# Patient Record
Sex: MALE | Race: WHITE | NOT HISPANIC OR LATINO | Employment: FULL TIME | URBAN - METROPOLITAN AREA
[De-identification: names, ages, dates, MRNs, and addresses within clinical notes are randomized per-mention and may not be internally consistent; named-entity substitution may affect disease eponyms.]

---

## 2023-04-28 ENCOUNTER — TELEPHONE (OUTPATIENT)
Dept: PRIMARY CARE | Facility: CLINIC | Age: 43
End: 2023-04-28
Payer: COMMERCIAL

## 2023-04-28 NOTE — TELEPHONE ENCOUNTER
PT states he has blood in his semen. Pt does not have blood in his urine and it is not painful. Please advise on scheduling and/ or what the PT should do.

## 2023-05-03 PROBLEM — D17.0 LIPOMA OF SKIN AND SUBCUTANEOUS TISSUE OF NECK: Status: ACTIVE | Noted: 2023-05-03

## 2023-05-03 PROBLEM — D17.1 LIPOMA OF TORSO: Status: ACTIVE | Noted: 2023-05-03

## 2023-05-03 PROBLEM — F32.1 MODERATE SINGLE CURRENT EPISODE OF MAJOR DEPRESSIVE DISORDER (MULTI): Status: ACTIVE | Noted: 2023-05-03

## 2023-05-03 RX ORDER — ESCITALOPRAM OXALATE 20 MG/1
1 TABLET ORAL DAILY
COMMUNITY
Start: 2020-02-24 | End: 2023-05-10 | Stop reason: SDUPTHER

## 2023-05-04 ENCOUNTER — LAB (OUTPATIENT)
Dept: LAB | Facility: LAB | Age: 43
End: 2023-05-04
Payer: COMMERCIAL

## 2023-05-04 ENCOUNTER — OFFICE VISIT (OUTPATIENT)
Dept: PRIMARY CARE | Facility: CLINIC | Age: 43
End: 2023-05-04
Payer: COMMERCIAL

## 2023-05-04 VITALS
HEIGHT: 71 IN | OXYGEN SATURATION: 97 % | HEART RATE: 57 BPM | BODY MASS INDEX: 28.42 KG/M2 | DIASTOLIC BLOOD PRESSURE: 78 MMHG | WEIGHT: 203 LBS | SYSTOLIC BLOOD PRESSURE: 110 MMHG

## 2023-05-04 DIAGNOSIS — R53.83 FATIGUE, UNSPECIFIED TYPE: ICD-10-CM

## 2023-05-04 DIAGNOSIS — R36.1 HEMATOSPERMIA: ICD-10-CM

## 2023-05-04 DIAGNOSIS — R36.1 HEMATOSPERMIA: Primary | ICD-10-CM

## 2023-05-04 DIAGNOSIS — Z15.89 MTHFR GENE MUTATION: ICD-10-CM

## 2023-05-04 LAB
ALANINE AMINOTRANSFERASE (SGPT) (U/L) IN SER/PLAS: 36 U/L (ref 10–52)
ALBUMIN (G/DL) IN SER/PLAS: 4.1 G/DL (ref 3.4–5)
ALKALINE PHOSPHATASE (U/L) IN SER/PLAS: 77 U/L (ref 33–120)
ANION GAP IN SER/PLAS: 10 MMOL/L (ref 10–20)
ASPARTATE AMINOTRANSFERASE (SGOT) (U/L) IN SER/PLAS: 18 U/L (ref 9–39)
BASOPHILS (10*3/UL) IN BLOOD BY AUTOMATED COUNT: 0.03 X10E9/L (ref 0–0.1)
BASOPHILS/100 LEUKOCYTES IN BLOOD BY AUTOMATED COUNT: 0.4 % (ref 0–2)
BILIRUBIN TOTAL (MG/DL) IN SER/PLAS: 0.7 MG/DL (ref 0–1.2)
CALCIUM (MG/DL) IN SER/PLAS: 9 MG/DL (ref 8.6–10.3)
CARBON DIOXIDE, TOTAL (MMOL/L) IN SER/PLAS: 29 MMOL/L (ref 21–32)
CHLORIDE (MMOL/L) IN SER/PLAS: 104 MMOL/L (ref 98–107)
CHOLESTEROL (MG/DL) IN SER/PLAS: 218 MG/DL (ref 0–199)
CHOLESTEROL IN HDL (MG/DL) IN SER/PLAS: 35 MG/DL
CHOLESTEROL/HDL RATIO: 6.2
CREATININE (MG/DL) IN SER/PLAS: 0.95 MG/DL (ref 0.5–1.3)
EOSINOPHILS (10*3/UL) IN BLOOD BY AUTOMATED COUNT: 0.09 X10E9/L (ref 0–0.7)
EOSINOPHILS/100 LEUKOCYTES IN BLOOD BY AUTOMATED COUNT: 1.2 % (ref 0–6)
ERYTHROCYTE DISTRIBUTION WIDTH (RATIO) BY AUTOMATED COUNT: 12.4 % (ref 11.5–14.5)
ERYTHROCYTE MEAN CORPUSCULAR HEMOGLOBIN CONCENTRATION (G/DL) BY AUTOMATED: 33.6 G/DL (ref 32–36)
ERYTHROCYTE MEAN CORPUSCULAR VOLUME (FL) BY AUTOMATED COUNT: 90 FL (ref 80–100)
ERYTHROCYTES (10*6/UL) IN BLOOD BY AUTOMATED COUNT: 4.69 X10E12/L (ref 4.5–5.9)
GFR MALE: >90 ML/MIN/1.73M2
GLUCOSE (MG/DL) IN SER/PLAS: 85 MG/DL (ref 74–99)
HEMATOCRIT (%) IN BLOOD BY AUTOMATED COUNT: 42 % (ref 41–52)
HEMOGLOBIN (G/DL) IN BLOOD: 14.1 G/DL (ref 13.5–17.5)
IMMATURE GRANULOCYTES/100 LEUKOCYTES IN BLOOD BY AUTOMATED COUNT: 0.6 % (ref 0–0.9)
LDL: 164 MG/DL (ref 0–99)
LEUKOCYTES (10*3/UL) IN BLOOD BY AUTOMATED COUNT: 7.7 X10E9/L (ref 4.4–11.3)
LYMPHOCYTES (10*3/UL) IN BLOOD BY AUTOMATED COUNT: 2.96 X10E9/L (ref 1.2–4.8)
LYMPHOCYTES/100 LEUKOCYTES IN BLOOD BY AUTOMATED COUNT: 38.3 % (ref 13–44)
MONOCYTES (10*3/UL) IN BLOOD BY AUTOMATED COUNT: 0.67 X10E9/L (ref 0.1–1)
MONOCYTES/100 LEUKOCYTES IN BLOOD BY AUTOMATED COUNT: 8.7 % (ref 2–10)
NEUTROPHILS (10*3/UL) IN BLOOD BY AUTOMATED COUNT: 3.93 X10E9/L (ref 1.2–7.7)
NEUTROPHILS/100 LEUKOCYTES IN BLOOD BY AUTOMATED COUNT: 50.8 % (ref 40–80)
PLATELETS (10*3/UL) IN BLOOD AUTOMATED COUNT: 316 X10E9/L (ref 150–450)
POC APPEARANCE, URINE: CLEAR
POC BILIRUBIN, URINE: NEGATIVE
POC BLOOD, URINE: NEGATIVE
POC COLOR, URINE: YELLOW
POC GLUCOSE, URINE: NEGATIVE MG/DL
POC KETONES, URINE: NEGATIVE MG/DL
POC LEUKOCYTES, URINE: NEGATIVE
POC NITRITE,URINE: NEGATIVE
POC PH, URINE: 7.5 PH
POC PROTEIN, URINE: NEGATIVE MG/DL
POC SPECIFIC GRAVITY, URINE: 1.01
POC UROBILINOGEN, URINE: 0.2 EU/DL
POTASSIUM (MMOL/L) IN SER/PLAS: 4.3 MMOL/L (ref 3.5–5.3)
PROTEIN TOTAL: 6.3 G/DL (ref 6.4–8.2)
SODIUM (MMOL/L) IN SER/PLAS: 139 MMOL/L (ref 136–145)
THYROTROPIN (MIU/L) IN SER/PLAS BY DETECTION LIMIT <= 0.05 MIU/L: 1.65 MIU/L (ref 0.44–3.98)
TRIGLYCERIDE (MG/DL) IN SER/PLAS: 97 MG/DL (ref 0–149)
UREA NITROGEN (MG/DL) IN SER/PLAS: 11 MG/DL (ref 6–23)
VLDL: 19 MG/DL (ref 0–40)

## 2023-05-04 PROCEDURE — 80061 LIPID PANEL: CPT

## 2023-05-04 PROCEDURE — 1036F TOBACCO NON-USER: CPT | Performed by: FAMILY MEDICINE

## 2023-05-04 PROCEDURE — 84153 ASSAY OF PSA TOTAL: CPT

## 2023-05-04 PROCEDURE — 36415 COLL VENOUS BLD VENIPUNCTURE: CPT

## 2023-05-04 PROCEDURE — 99214 OFFICE O/P EST MOD 30 MIN: CPT | Performed by: FAMILY MEDICINE

## 2023-05-04 PROCEDURE — 84443 ASSAY THYROID STIM HORMONE: CPT

## 2023-05-04 PROCEDURE — 80053 COMPREHEN METABOLIC PANEL: CPT

## 2023-05-04 PROCEDURE — 85025 COMPLETE CBC W/AUTO DIFF WBC: CPT

## 2023-05-04 RX ORDER — MULTIVITAMIN
1 TABLET ORAL
COMMUNITY
Start: 2013-01-22

## 2023-05-04 ASSESSMENT — ENCOUNTER SYMPTOMS
PALPITATIONS: 0
VOMITING: 0
UNEXPECTED WEIGHT CHANGE: 0
CONSTIPATION: 0

## 2023-05-04 NOTE — PROGRESS NOTES
"Subjective   Patient ID: Cody Davies is a 43 y.o. male who presents for blood in semen (Had COVID about 3 weeks ago, in the last week has had blood in semen x 3 frankly burgundy in color, denies pain with ejaculation, denies injury, urinating normally, just needs to have it evaluated/During COVID dis have a lot of muscle pain took 1 leftover oxycontin for the pain that made his testicles feel kind of tingly.).    HPI   No fever chills no UTI symptoms no other bleeding bruising or history of bleeding problems no chance for STDs he says and has not traveled recently  Review of Systems   Constitutional:  Negative for unexpected weight change.   HENT:  Negative for congestion and ear discharge.    Cardiovascular:  Negative for chest pain and palpitations.   Gastrointestinal:  Negative for constipation and vomiting.   All other systems reviewed and are negative.  Does take fish oil supplements 4 thousand daily    Objective   /78   Pulse 57   Ht 1.791 m (5' 10.5\")   Wt 92.1 kg (203 lb)   SpO2 97%   BMI 28.72 kg/m²     Physical Exam  HENT:      Head: Normocephalic and atraumatic.      Nose: Nose normal.      Mouth/Throat:      Mouth: Mucous membranes are moist.      Pharynx: No oropharyngeal exudate.   Eyes:      Extraocular Movements: Extraocular movements intact.      Conjunctiva/sclera: Conjunctivae normal.      Pupils: Pupils are equal, round, and reactive to light.   Cardiovascular:      Rate and Rhythm: Normal rate and regular rhythm.   Pulmonary:      Effort: Pulmonary effort is normal.   Abdominal:      General: There is no distension.      Palpations: Abdomen is soft.   Musculoskeletal:      Cervical back: Normal range of motion and neck supple.   Lymphadenopathy:      Cervical: No cervical adenopathy.   Neurological:      General: No focal deficit present.      Mental Status: He is alert.   Psychiatric:         Attention and Perception: Attention normal.         Speech: Speech normal.         " Behavior: Behavior is cooperative.      circumcised penis no hernias or LAD testes unremarkable no penile discharge  GR normal tone prostate not enlarged no nodules no tenderness    Assessment/Plan   Diagnoses and all orders for this visit:  Hematospermia  Comments:  Reassurance given, recommend rest genital organs to prevent ongoing bleeding and trauma, hold fish oil for 1 week, check labs  Orders:  -     Lipid Panel; Future  -     CBC and Auto Differential; Future  -     Comprehensive Metabolic Panel; Future  -     Prostate Specific Antigen; Future  -     Thyroid Stimulating Hormone; Future  MTHFR gene mutation  -     Lipid Panel; Future  -     CBC and Auto Differential; Future  -     Comprehensive Metabolic Panel; Future  -     Prostate Specific Antigen; Future  -     Thyroid Stimulating Hormone; Future  Fatigue, unspecified type  -     Lipid Panel; Future  -     CBC and Auto Differential; Future  -     Comprehensive Metabolic Panel; Future  -     Prostate Specific Antigen; Future  -     Thyroid Stimulating Hormone; Future  Let me know if persisting after 2 to 3 weeks

## 2023-05-05 LAB — PROSTATE SPECIFIC AG (NG/ML) IN SER/PLAS: 0.7 NG/ML (ref 0–4)

## 2023-05-08 ENCOUNTER — TELEPHONE (OUTPATIENT)
Dept: PRIMARY CARE | Facility: CLINIC | Age: 43
End: 2023-05-08
Payer: COMMERCIAL

## 2023-05-08 NOTE — TELEPHONE ENCOUNTER
----- Message from Giovanni Contreras MD sent at 5/4/2023  4:35 PM EDT -----  Labs look good except chol is quite high, he must have a familial high cholesterol  Consider rosuvastatin 10 mg po every day  Follow low fat low chol diet  Be active every day like walking an hour

## 2023-05-08 NOTE — TELEPHONE ENCOUNTER
Result Communication    Resulted Orders   Lipid Panel   Result Value Ref Range    Cholesterol 218 (H) 0 - 199 mg/dL      Comment:      .      AGE      DESIRABLE   BORDERLINE HIGH   HIGH     0-19 Y     0 - 169       170 - 199     >/= 200    20-24 Y     0 - 189       190 - 224     >/= 225         >24 Y     0 - 199       200 - 239     >/= 240   **All ranges are based on fasting samples. Specific   therapeutic targets will vary based on patient-specific   cardiac risk.  .   Pediatric guidelines reference:Pediatrics 2011, 128(S5).   Adult guidelines reference: NCEP ATPIII Guidelines,     SELINA 2001, 258:2486-97  .   Venipuncture immediately after or during the    administration of Metamizole may lead to falsely   low results. Testing should be performed immediately   prior to Metamizole dosing.    HDL 35.0 (A) mg/dL      Comment:      .      AGE      VERY LOW   LOW     NORMAL    HIGH       0-19 Y       < 35   < 40     40-45     ----    20-24 Y       ----   < 40       >45     ----      >24 Y       ----   < 40     40-60      >60  .    Cholesterol/HDL Ratio 6.2 (A)       Comment:      REF VALUES  DESIRABLE  < 3.4  HIGH RISK  > 5.0     (H) 0 - 99 mg/dL      Comment:      .                           NEAR      BORD      AGE      DESIRABLE  OPTIMAL    HIGH     HIGH     VERY HIGH     0-19 Y     0 - 109     ---    110-129   >/= 130     ----    20-24 Y     0 - 119     ---    120-159   >/= 160     ----      >24 Y     0 -  99   100-129  130-159   160-189     >/=190  .    VLDL 19 0 - 40 mg/dL    Triglycerides 97 0 - 149 mg/dL      Comment:      .      AGE      DESIRABLE   BORDERLINE HIGH   HIGH     VERY HIGH   0 D-90 D    19 - 174         ----         ----        ----  91 D- 9 Y     0 -  74        75 -  99     >/= 100      ----    10-19 Y     0 -  89        90 - 129     >/= 130      ----    20-24 Y     0 - 114       115 - 149     >/= 150      ----         >24 Y     0 - 149       150 - 199    200- 499    >/= 500  .    Venipuncture immediately after or during the    administration of Metamizole may lead to falsely   low results. Testing should be performed immediately   prior to Metamizole dosing.   CBC and Auto Differential   Result Value Ref Range    WBC 7.7 4.4 - 11.3 x10E9/L    RBC 4.69 4.50 - 5.90 x10E12/L    Hemoglobin 14.1 13.5 - 17.5 g/dL    Hematocrit 42.0 41.0 - 52.0 %    MCV 90 80 - 100 fL    MCHC 33.6 32.0 - 36.0 g/dL    Platelets 316 150 - 450 x10E9/L    RDW 12.4 11.5 - 14.5 %    Neutrophils % 50.8 40.0 - 80.0 %    Immature Granulocytes %, Automated 0.6 0.0 - 0.9 %      Comment:       Immature Granulocyte Count (IG) includes promyelocytes,    myelocytes and metamyelocytes but does not include bands.   Percent differential counts (%) should be interpreted in the   context of the absolute cell counts (cells/L).    Lymphocytes % 38.3 13.0 - 44.0 %    Monocytes % 8.7 2.0 - 10.0 %    Eosinophils % 1.2 0.0 - 6.0 %    Basophils % 0.4 0.0 - 2.0 %    Neutrophils Absolute 3.93 1.20 - 7.70 x10E9/L    Lymphocytes Absolute 2.96 1.20 - 4.80 x10E9/L    Monocytes Absolute 0.67 0.10 - 1.00 x10E9/L    Eosinophils Absolute 0.09 0.00 - 0.70 x10E9/L    Basophils Absolute 0.03 0.00 - 0.10 x10E9/L   Comprehensive Metabolic Panel   Result Value Ref Range    Glucose 85 74 - 99 mg/dL    Sodium 139 136 - 145 mmol/L    Potassium 4.3 3.5 - 5.3 mmol/L    Chloride 104 98 - 107 mmol/L    Bicarbonate 29 21 - 32 mmol/L    Anion Gap 10 10 - 20 mmol/L    Urea Nitrogen 11 6 - 23 mg/dL    Creatinine 0.95 0.50 - 1.30 mg/dL    GFR MALE >90 >90 mL/min/1.73m2      Comment:       CALCULATIONS OF ESTIMATED GFR ARE PERFORMED   USING THE 2021 CKD-EPI STUDY REFIT EQUATION   WITHOUT THE RACE VARIABLE FOR THE IDMS-TRACEABLE   CREATININE METHODS.    https://jasn.asnjournals.org/content/early/2021/09/22/ASN.9518334199    Calcium 9.0 8.6 - 10.3 mg/dL    Albumin 4.1 3.4 - 5.0 g/dL    Alkaline Phosphatase 77 33 - 120 U/L    Total Protein 6.3 (L) 6.4 - 8.2 g/dL    AST 18  9 - 39 U/L    Total Bilirubin 0.7 0.0 - 1.2 mg/dL    ALT (SGPT) 36 10 - 52 U/L      Comment:       Patients treated with Sulfasalazine may generate    falsely decreased results for ALT.   Thyroid Stimulating Hormone   Result Value Ref Range    TSH 1.65 0.44 - 3.98 mIU/L      Comment:       TSH testing is performed using different testing    methodology at [facility] than at other    [facility] hospitals. Direct result comparisons should    only be made within the same method.       5:36 PM      Results were successfully communicated with the patient and they acknowledged their understanding.

## 2023-05-09 DIAGNOSIS — E78.2 MIXED HYPERLIPIDEMIA: ICD-10-CM

## 2023-05-09 NOTE — PROGRESS NOTES
Pt. Would like to try to lower cholesterol with diet and exercise, will repeat lipid panel in 3 months.

## 2023-05-10 DIAGNOSIS — F32.1 MODERATE SINGLE CURRENT EPISODE OF MAJOR DEPRESSIVE DISORDER (MULTI): ICD-10-CM

## 2023-05-10 RX ORDER — ESCITALOPRAM OXALATE 20 MG/1
20 TABLET ORAL DAILY
Qty: 90 TABLET | Refills: 1 | Status: SHIPPED | OUTPATIENT
Start: 2023-05-10 | End: 2023-10-24

## 2023-05-10 NOTE — TELEPHONE ENCOUNTER
PT HAS A NEW PHARMACY    Rx Refill Request Telephone Encounter    Name:  Cody Davies  :  [id number]  Medication Name:    ESCITALOPRAM 20MG Q D 90 DAY SUPPLY   Specific Pharmacy location:  [facility]   Date of last appointment:  2023  Date of next appointment:  N/A  Best number to reach patient:  [phone]

## 2023-05-17 ENCOUNTER — OFFICE VISIT (OUTPATIENT)
Dept: PRIMARY CARE | Facility: CLINIC | Age: 43
End: 2023-05-17
Payer: COMMERCIAL

## 2023-05-17 ENCOUNTER — TELEPHONE (OUTPATIENT)
Dept: PRIMARY CARE | Facility: CLINIC | Age: 43
End: 2023-05-17

## 2023-05-17 VITALS
SYSTOLIC BLOOD PRESSURE: 120 MMHG | WEIGHT: 201 LBS | OXYGEN SATURATION: 98 % | HEART RATE: 66 BPM | BODY MASS INDEX: 28.43 KG/M2 | DIASTOLIC BLOOD PRESSURE: 64 MMHG | TEMPERATURE: 97.7 F

## 2023-05-17 DIAGNOSIS — R19.7 DIARRHEA, UNSPECIFIED TYPE: Primary | ICD-10-CM

## 2023-05-17 PROCEDURE — 99214 OFFICE O/P EST MOD 30 MIN: CPT | Performed by: FAMILY MEDICINE

## 2023-05-17 PROCEDURE — 1036F TOBACCO NON-USER: CPT | Performed by: FAMILY MEDICINE

## 2023-05-17 ASSESSMENT — ENCOUNTER SYMPTOMS
CONFUSION: 0
ABDOMINAL PAIN: 0
UNEXPECTED WEIGHT CHANGE: 0
FEVER: 0
TROUBLE SWALLOWING: 0
DYSURIA: 0
WEAKNESS: 0
DIFFICULTY URINATING: 0
DIZZINESS: 0
NAUSEA: 0
SHORTNESS OF BREATH: 0
COUGH: 0
DIARRHEA: 1
BLOOD IN STOOL: 0
WHEEZING: 0
CHILLS: 0
NUMBNESS: 0
VOMITING: 0
LIGHT-HEADEDNESS: 0

## 2023-05-17 NOTE — PROGRESS NOTES
Subjective   Patient ID: Cody Davies is a 43 y.o. male who presents for Diarrhea.  HPI    Onset Monday. Got cleaned out , felt a little better yesterday then started again last jerilyn and during the night, Has done fluids and a little food.  Wifes mother is caring for her family member in a nursing facility with jolie saw mother in law 7 days ago.   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  No diarrhea today, but hasn't eaten much either.  Denies fever, a little fatigued, mild aches.  Everything he's been eating, have also been eaten by everyone else in the house, no one else is having problems.    Over last 24 hours has had 6 episodes of diarrhea.     Had COVID 4 weeks ago.    Review of Systems   Constitutional:  Negative for chills, fever and unexpected weight change.   HENT:  Negative for ear pain and trouble swallowing.    Respiratory:  Negative for cough, shortness of breath and wheezing.    Cardiovascular:  Negative for chest pain.   Gastrointestinal:  Positive for diarrhea. Negative for abdominal pain, blood in stool, nausea and vomiting.   Genitourinary:  Negative for difficulty urinating and dysuria.   Skin:  Negative for rash.   Neurological:  Negative for dizziness, syncope, weakness, light-headedness and numbness.   Psychiatric/Behavioral:  Negative for behavioral problems and confusion.          Objective   /64   Pulse 66   Temp 36.5 °C (97.7 °F)   Wt 91.2 kg (201 lb)   SpO2 98%   BMI 28.43 kg/m²     Physical Exam  Vitals and nursing note reviewed.   Constitutional:       General: He is not in acute distress.     Appearance: Normal appearance.   HENT:      Head: Normocephalic and atraumatic.   Eyes:      General: No scleral icterus.     Extraocular Movements: Extraocular movements intact.      Conjunctiva/sclera: Conjunctivae normal.   Pulmonary:      Effort: Pulmonary effort is normal. No respiratory distress.    Abdominal:      General: Abdomen is flat. Bowel sounds are normal. There is no distension.      Palpations: Abdomen is soft. There is no mass.      Tenderness: There is no abdominal tenderness. There is no right CVA tenderness, left CVA tenderness, guarding or rebound.   Skin:     General: Skin is warm and dry.      Coloration: Skin is not jaundiced.   Neurological:      Mental Status: He is alert and oriented to person, place, and time. Mental status is at baseline.   Psychiatric:         Mood and Affect: Mood normal.         Thought Content: Thought content normal.         Assessment/Plan   Problem List Items Addressed This Visit          Other    Diarrhea - Primary     Likely viral, day 2. [place] diet, hydration. Due to work in  industry, check stool studies.         Relevant Orders    C. difficile, PCR    Ova/Para + Giardia/Cryptosporidium Antigen    Stool Pathogen Panel, PCR

## 2023-05-17 NOTE — PATIENT INSTRUCTIONS
Anticipate usual course of viral gastroenteritis. Vomiting usually lasts for one to two days (up to 4 days) and diarrhea for five to seven days (up to 2 weeks). Recommend a bland (e.g., B.R.A.T. diet), and staying well hydrated. Please seek immediate medical attention or go to the ER/call 911 if unable to stay adequately hydrated (e.g., inability to hold down liquids for 24 hours, visibly dry mouth, decreased urine output, crying without tears, altered consciousness), bloody or black stool or vomit, high fever (>103), fever going away for 48 hours then returning, more than 6-10 stools per day, severe pain, severe symptoms, or other serious concern.    Bananas, Rice, Applesauce, (plain) Toast (B.R.A.T.)    For assistance with scheduling referrals or consultations, please call [phone]. For laboratory, radiology, and other tests, please call [phone] ([phone] for pediatrics). Please review prescription inserts and published information for possible adverse effects. Return after testing or consultation to review results and recommendations, if symptoms persist, change, worsen, or return, or if you have any question or concern. For non-emergencies, you may call the office. For emergencies, call 9-1-1 or go to the nearest Emergency Department. Please schedule additional appointment(s) to address concern(s) not addressed today.    In general, results will not be released or discussed over the telephone. Results of tests done through [facility] are released via  [employer]:  [email or web address]    Until we complete our transition to the new system, additional information can be found at [email or web address] or on your Android or iOS (iPhone, iPad) device using the [employer] betty available free of charge in your device's betty store.

## 2023-05-17 NOTE — TELEPHONE ENCOUNTER
Pt states he has had diarrhea for 3 days now, with very mild nausea when it first started, denies any other cold or flu like symptoms.  Has had minimal contact with a family member who has C-diff.  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 Should he be concerned about C-diff?  Should he have stool test?  Be seen in office?

## 2023-10-24 DIAGNOSIS — F32.1 MODERATE SINGLE CURRENT EPISODE OF MAJOR DEPRESSIVE DISORDER (MULTI): ICD-10-CM

## 2023-10-24 RX ORDER — ESCITALOPRAM OXALATE 20 MG/1
20 TABLET ORAL DAILY
Qty: 90 TABLET | Refills: 3 | Status: SHIPPED | OUTPATIENT
Start: 2023-10-24 | End: 2023-11-06

## 2023-11-04 DIAGNOSIS — F32.1 MODERATE SINGLE CURRENT EPISODE OF MAJOR DEPRESSIVE DISORDER (MULTI): ICD-10-CM

## 2023-11-06 RX ORDER — ESCITALOPRAM OXALATE 20 MG/1
20 TABLET ORAL DAILY
Qty: 90 TABLET | Refills: 1 | Status: SHIPPED | OUTPATIENT
Start: 2023-11-06 | End: 2024-04-23

## 2024-04-01 ENCOUNTER — OFFICE VISIT (OUTPATIENT)
Dept: PRIMARY CARE | Facility: CLINIC | Age: 44
End: 2024-04-01
Payer: COMMERCIAL

## 2024-04-01 ENCOUNTER — LAB (OUTPATIENT)
Dept: LAB | Facility: LAB | Age: 44
End: 2024-04-01
Payer: COMMERCIAL

## 2024-04-01 VITALS
BODY MASS INDEX: 29.12 KG/M2 | OXYGEN SATURATION: 98 % | HEART RATE: 77 BPM | WEIGHT: 208 LBS | SYSTOLIC BLOOD PRESSURE: 126 MMHG | HEIGHT: 71 IN | DIASTOLIC BLOOD PRESSURE: 74 MMHG

## 2024-04-01 DIAGNOSIS — Z12.5 PROSTATE CANCER SCREENING: ICD-10-CM

## 2024-04-01 DIAGNOSIS — R36.1 HEMATOSPERMIA: ICD-10-CM

## 2024-04-01 DIAGNOSIS — E78.2 MIXED HYPERLIPIDEMIA: ICD-10-CM

## 2024-04-01 DIAGNOSIS — K50.819 CROHN'S DISEASE OF SMALL AND LARGE INTESTINES WITH COMPLICATION (MULTI): Primary | ICD-10-CM

## 2024-04-01 LAB
POC APPEARANCE, URINE: CLEAR
POC BILIRUBIN, URINE: NEGATIVE
POC BLOOD, URINE: NEGATIVE
POC COLOR, URINE: YELLOW
POC GLUCOSE, URINE: NEGATIVE MG/DL
POC KETONES, URINE: NEGATIVE MG/DL
POC LEUKOCYTES, URINE: NEGATIVE
POC NITRITE,URINE: NEGATIVE
POC PH, URINE: 8 PH
POC PROTEIN, URINE: NEGATIVE MG/DL
POC SPECIFIC GRAVITY, URINE: 1.02
POC UROBILINOGEN, URINE: 0.2 EU/DL

## 2024-04-01 PROCEDURE — 99213 OFFICE O/P EST LOW 20 MIN: CPT | Performed by: FAMILY MEDICINE

## 2024-04-01 PROCEDURE — 81002 URINALYSIS NONAUTO W/O SCOPE: CPT | Performed by: FAMILY MEDICINE

## 2024-04-01 PROCEDURE — 1036F TOBACCO NON-USER: CPT | Performed by: FAMILY MEDICINE

## 2024-04-01 ASSESSMENT — ENCOUNTER SYMPTOMS
CONSTIPATION: 0
UNEXPECTED WEIGHT CHANGE: 0
PALPITATIONS: 0
VOMITING: 0

## 2024-04-01 ASSESSMENT — COLUMBIA-SUICIDE SEVERITY RATING SCALE - C-SSRS
6. HAVE YOU EVER DONE ANYTHING, STARTED TO DO ANYTHING, OR PREPARED TO DO ANYTHING TO END YOUR LIFE?: NO
2. HAVE YOU ACTUALLY HAD ANY THOUGHTS OF KILLING YOURSELF?: NO
1. IN THE PAST MONTH, HAVE YOU WISHED YOU WERE DEAD OR WISHED YOU COULD GO TO SLEEP AND NOT WAKE UP?: NO

## 2024-04-01 ASSESSMENT — PATIENT HEALTH QUESTIONNAIRE - PHQ9
SUM OF ALL RESPONSES TO PHQ9 QUESTIONS 1 AND 2: 0
2. FEELING DOWN, DEPRESSED OR HOPELESS: NOT AT ALL
1. LITTLE INTEREST OR PLEASURE IN DOING THINGS: NOT AT ALL

## 2024-04-01 NOTE — PROGRESS NOTES
"Subjective   Patient ID: Cody Davies is a 44 y.o. male who presents for Blood in Semen (Happened a year ago a couple times then a couple times more recently no pain ) and Follow-up (meds).    HPI   No fevers chills weight loss pelvic pain back pain  No history of cancer  Patient says he does not feel anything abnormal in his scrotum    Review of Systems   Constitutional:  Negative for unexpected weight change.   HENT:  Negative for congestion and ear discharge.    Cardiovascular:  Negative for chest pain and palpitations.   Gastrointestinal:  Negative for constipation and vomiting.   All other systems reviewed and are negative.      Objective   /74   Pulse 77   Ht 1.791 m (5' 10.5\")   Wt 94.3 kg (208 lb)   SpO2 98%   BMI 29.42 kg/m²     Physical Exam  HENT:      Head: Normocephalic and atraumatic.      Nose: Nose normal.      Mouth/Throat:      Mouth: Mucous membranes are moist.      Pharynx: No oropharyngeal exudate.   Eyes:      Extraocular Movements: Extraocular movements intact.      Conjunctiva/sclera: Conjunctivae normal.      Pupils: Pupils are equal, round, and reactive to light.   Cardiovascular:      Rate and Rhythm: Normal rate and regular rhythm.   Pulmonary:      Effort: Pulmonary effort is normal.   Abdominal:      General: There is no distension.      Palpations: Abdomen is soft.   Musculoskeletal:      Cervical back: Normal range of motion and neck supple.   Lymphadenopathy:      Cervical: No cervical adenopathy.   Neurological:      General: No focal deficit present.      Mental Status: He is alert.   Psychiatric:         Attention and Perception: Attention normal.         Speech: Speech normal.         Behavior: Behavior is cooperative.      exam circumcised penis, testes descended without nodules or masses, normal scrotal contents, no lymphadenopathy, no hernias in inguinal area    Assessment/Plan   Diagnoses and all orders for this visit:  Prostate cancer screening  -     " Prostate Specific Antigen, Screen; Future  Hematospermia  Comments:  Discussed with patient probably benign etiology but given age will check labs and with normal UA refer to urology  Orders:  -     Prostate Specific Antigen, Screen; Future  -     Referral to Urology; Future  -     POCT UA (nonautomated) manually resulted  Crohn's disease of small and large intestines with complication (CMS/HCC)  Comments:  Not active  Recheck 1 month if symptoms persist sooner if worse

## 2024-04-04 ENCOUNTER — LAB (OUTPATIENT)
Dept: LAB | Facility: LAB | Age: 44
End: 2024-04-04
Payer: COMMERCIAL

## 2024-04-04 DIAGNOSIS — E78.2 MIXED HYPERLIPIDEMIA: ICD-10-CM

## 2024-04-04 DIAGNOSIS — R36.1 HEMATOSPERMIA: ICD-10-CM

## 2024-04-04 DIAGNOSIS — Z12.5 PROSTATE CANCER SCREENING: ICD-10-CM

## 2024-04-04 LAB
CHOLEST SERPL-MCNC: 235 MG/DL (ref 0–199)
CHOLESTEROL/HDL RATIO: 6.1
HDLC SERPL-MCNC: 38.8 MG/DL
LDLC SERPL CALC-MCNC: 139 MG/DL
NON HDL CHOLESTEROL: 196 MG/DL (ref 0–149)
PSA SERPL-MCNC: 0.56 NG/ML
TRIGL SERPL-MCNC: 284 MG/DL (ref 0–149)
VLDL: 57 MG/DL (ref 0–40)

## 2024-04-04 PROCEDURE — 36415 COLL VENOUS BLD VENIPUNCTURE: CPT

## 2024-04-04 PROCEDURE — 84153 ASSAY OF PSA TOTAL: CPT

## 2024-04-04 PROCEDURE — 80061 LIPID PANEL: CPT

## 2024-04-04 NOTE — RESULT ENCOUNTER NOTE
Your cholesterol was mildly elevated.  I would recommend regular exercise, keep your weight under control, and follow a low-fat, low-cholesterol diet.  Plant-based diets are ideal for reducing cholesterol and heart disease risk.  A diet high in fruits and vegetables, legumes, nuts, whole grains and fish with minimal amounts of animal products is recommended.  I would also suggest adding on a Fish oil supplement daily. I recommend repeat a fasting lipid profile in one year.  PSA is normal

## 2024-04-23 DIAGNOSIS — F32.1 MODERATE SINGLE CURRENT EPISODE OF MAJOR DEPRESSIVE DISORDER (MULTI): ICD-10-CM

## 2024-04-23 RX ORDER — ESCITALOPRAM OXALATE 20 MG/1
20 TABLET ORAL DAILY
Qty: 90 TABLET | Refills: 2 | Status: SHIPPED | OUTPATIENT
Start: 2024-04-23

## 2024-06-14 ENCOUNTER — APPOINTMENT (OUTPATIENT)
Dept: UROLOGY | Facility: CLINIC | Age: 44
End: 2024-06-14
Payer: COMMERCIAL

## 2024-08-14 ENCOUNTER — APPOINTMENT (OUTPATIENT)
Dept: UROLOGY | Facility: CLINIC | Age: 44
End: 2024-08-14
Payer: COMMERCIAL

## 2024-08-14 DIAGNOSIS — R36.1 HEMATOSPERMIA: Primary | ICD-10-CM

## 2024-08-14 LAB
POC APPEARANCE, URINE: CLEAR
POC BILIRUBIN, URINE: NEGATIVE
POC BLOOD, URINE: NEGATIVE
POC COLOR, URINE: YELLOW
POC GLUCOSE, URINE: NEGATIVE MG/DL
POC KETONES, URINE: NEGATIVE MG/DL
POC LEUKOCYTES, URINE: NEGATIVE
POC NITRITE,URINE: NEGATIVE
POC PH, URINE: 7.5 PH
POC PROTEIN, URINE: NEGATIVE MG/DL
POC SPECIFIC GRAVITY, URINE: 1.01
POC UROBILINOGEN, URINE: 0.2 EU/DL

## 2024-08-14 PROCEDURE — 81003 URINALYSIS AUTO W/O SCOPE: CPT | Performed by: NURSE PRACTITIONER

## 2024-08-14 PROCEDURE — 51798 US URINE CAPACITY MEASURE: CPT | Performed by: NURSE PRACTITIONER

## 2024-08-14 PROCEDURE — 99203 OFFICE O/P NEW LOW 30 MIN: CPT | Performed by: NURSE PRACTITIONER

## 2024-08-14 PROCEDURE — 1036F TOBACCO NON-USER: CPT | Performed by: NURSE PRACTITIONER

## 2024-08-14 NOTE — PROGRESS NOTES
[facility]  Outpatient Clinic Note    Subjective   Cody Davies is a 44 y.o. male    History of Present Illness   Patient presenting to clinic today NPV with complaint of Hematospermia intermittently   over the past year. History of constipation, diarrhea, and Depression. He has no bothersome urinary symptoms. No ED concerns, No history of UTI. No history of kidney stones, Prostate Cancer, or  malignancy.   Denies testicular pain or swelling. Patient denies gross hematuria, dysuria, frequency, fevers, n/v, or flank pain.   No straining to empty, strong stream, no NTF.   He is  with 3 children. Sexually active about every two weeks. Interested in Vasectomy   Urinalysis today is Negative   PVR 5 ml     Lab Results   Component Value Date    PSA 0.70 05/04/2023     Past Medical History and Surgical History   No past medical history on file.  No past surgical history on file.    Medications  Current Outpatient Medications on File Prior to Visit   Medication Sig Dispense Refill    escitalopram (Lexapro) 20 mg tablet TAKE 1 TABLET DAILY 90 tablet 2    multivitamin tablet Take 1 tablet by mouth once daily.       No current facility-administered medications on file prior to visit.       Objective   Physicial Exam  General: Well developed, well nourished, alert and cooperative, appears in no acute distress  Eyes: Non-injected conjunctiva, sclera clear, no proptosis  Cardiac: Extremities are warm and well perfused. No edema, cyanosis or pallor.   Lungs: Breathing is easy, non-labored. Speaking in clear and complete sentences. Normal diaphragmatic movement.  MSK: Ambulatory with steady gait, unassisted  Neuro: alert and oriented to person, place and time  Psych: Demonstrates good judgement and reason, without hallucinations, abnormal affect or abnormal behaviors.  Skin: no obvious lesions, no rashes.    Office Visit on 08/14/2024   Component Date Value Ref Range Status    POC Color, Urine 08/14/2024  Yellow  Straw, Yellow, Light-Yellow Final    POC Appearance, Urine 08/14/2024 Clear  Clear Final    POC Glucose, Urine 08/14/2024 NEGATIVE  NEGATIVE mg/dl Final    POC Bilirubin, Urine 08/14/2024 NEGATIVE  NEGATIVE Final    POC Ketones, Urine 08/14/2024 NEGATIVE  NEGATIVE mg/dl Final    POC Specific Gravity, Urine 08/14/2024 1.015  1.005 - 1.035 Final    POC Blood, Urine 08/14/2024 NEGATIVE  NEGATIVE Final    POC PH, Urine 08/14/2024 7.5  No Reference Range Established PH Final    POC Protein, Urine 08/14/2024 NEGATIVE  NEGATIVE, 30 (1+) mg/dl Final    POC Urobilinogen, Urine 08/14/2024 0.2  0.2, 1.0 EU/DL Final    Poc Nitrite, Urine 08/14/2024 NEGATIVE  NEGATIVE Final    POC Leukocytes, Urine 08/14/2024 NEGATIVE  NEGATIVE Final      Review of Systems  All other systems have been reviewed and are negative for complaint.      Assessment and Plan   The patient presents today with the complaint of blood in his ejaculate. I discussed that hematospermia is almost always benign. We discussed the various etiologies such as inflammation, infection, calculi, trauma, obstruction, and cysts. Hematospermia is rarely due to tumors or vascular abnormalities such as AV fistula.     Urinalysis today is Negative   5/4/2023 PSA  was 0.70    If symptoms persist, consider other diagnostic modalities such as prostate US, cystoscopy, semen culture, and pelvic MRI. Patient interested in Vasectomy. He will be scheduled with Dr. Morales     All questions and concerns were addressed. Patient verbalizes understanding and has no other questions at this time.     Margot Wiggins-- KARLO KHAN  Office Phone:  [phone]

## 2024-08-30 ENCOUNTER — APPOINTMENT (OUTPATIENT)
Dept: UROLOGY | Facility: CLINIC | Age: 44
End: 2024-08-30
Payer: COMMERCIAL

## 2024-09-03 ENCOUNTER — APPOINTMENT (OUTPATIENT)
Dept: UROLOGY | Facility: CLINIC | Age: 44
End: 2024-09-03
Payer: COMMERCIAL

## 2025-01-16 PROBLEM — R36.1 HEMOSPERMIA: Status: ACTIVE | Noted: 2023-05-04

## 2025-01-20 ENCOUNTER — APPOINTMENT (OUTPATIENT)
Dept: PRIMARY CARE | Facility: CLINIC | Age: 45
End: 2025-01-20
Payer: COMMERCIAL

## 2025-01-20 VITALS
HEART RATE: 67 BPM | SYSTOLIC BLOOD PRESSURE: 106 MMHG | WEIGHT: 218.2 LBS | OXYGEN SATURATION: 98 % | DIASTOLIC BLOOD PRESSURE: 76 MMHG | BODY MASS INDEX: 30.87 KG/M2

## 2025-01-20 DIAGNOSIS — G47.33 OSA (OBSTRUCTIVE SLEEP APNEA): Primary | ICD-10-CM

## 2025-01-20 DIAGNOSIS — F32.1 MODERATE SINGLE CURRENT EPISODE OF MAJOR DEPRESSIVE DISORDER (MULTI): ICD-10-CM

## 2025-01-20 PROCEDURE — 1036F TOBACCO NON-USER: CPT | Performed by: FAMILY MEDICINE

## 2025-01-20 PROCEDURE — 99213 OFFICE O/P EST LOW 20 MIN: CPT | Performed by: FAMILY MEDICINE

## 2025-01-20 RX ORDER — ESCITALOPRAM OXALATE 20 MG/1
20 TABLET ORAL DAILY
Qty: 90 TABLET | Refills: 3 | Status: SHIPPED | OUTPATIENT
Start: 2025-01-20

## 2025-01-20 ASSESSMENT — ENCOUNTER SYMPTOMS
UNEXPECTED WEIGHT CHANGE: 0
PALPITATIONS: 0
VOMITING: 0
CONSTIPATION: 0

## 2025-01-20 NOTE — PROGRESS NOTES
Subjective   Patient ID: Cody Davies is a 45 y.o. male who presents for Snoring (Pt is fatigued consistently.  Pt's brother does have sleep apnea.  Pt's wife stated that sometimes it sounds like he stops breathing. Pt has tried mouth tape.).    HPI   C/o snoring, fatigue, daytime sleepiness, witnessed apneas hs by wife    Review of Systems   Constitutional:  Negative for unexpected weight change.   HENT:  Negative for congestion and ear discharge.    Cardiovascular:  Negative for chest pain and palpitations.   Gastrointestinal:  Negative for constipation and vomiting.   All other systems reviewed and are negative.      Objective   /76   Pulse 67   Wt 99 kg (218 lb 3.2 oz)   SpO2 98%   BMI 30.87 kg/m²     Physical Exam  HENT:      Head: Normocephalic and atraumatic.      Nose: Nose normal.      Mouth/Throat:      Mouth: Mucous membranes are moist.      Pharynx: No oropharyngeal exudate.   Eyes:      Extraocular Movements: Extraocular movements intact.      Conjunctiva/sclera: Conjunctivae normal.      Pupils: Pupils are equal, round, and reactive to light.   Cardiovascular:      Rate and Rhythm: Normal rate and regular rhythm.   Pulmonary:      Effort: Pulmonary effort is normal.      Breath sounds: Normal breath sounds.   Abdominal:      General: There is no distension.      Palpations: Abdomen is soft.   Musculoskeletal:      Cervical back: Normal range of motion and neck supple.   Lymphadenopathy:      Cervical: No cervical adenopathy.   Neurological:      General: No focal deficit present.      Mental Status: He is alert.   Psychiatric:         Attention and Perception: Attention normal.         Speech: Speech normal.         Behavior: Behavior is cooperative.         Assessment/Plan   Problem List Items Addressed This Visit    None  Visit Diagnoses         Codes    TEO (obstructive sleep apnea)    -  Primary G47.33    Relevant Orders    Home sleep apnea test (HSAT)        Sleep hygiene  discussed  Will be in touch with sleep study results  Recheck here 2 months sooner if any issues arise

## 2025-01-24 ENCOUNTER — PROCEDURE VISIT (OUTPATIENT)
Dept: SLEEP MEDICINE | Facility: HOSPITAL | Age: 45
End: 2025-01-24
Payer: COMMERCIAL

## 2025-01-24 DIAGNOSIS — G47.33 OSA (OBSTRUCTIVE SLEEP APNEA): ICD-10-CM

## 2025-01-24 PROCEDURE — 95806 SLEEP STUDY UNATT&RESP EFFT: CPT | Performed by: PSYCHIATRY & NEUROLOGY

## 2025-02-07 ENCOUNTER — TELEPHONE (OUTPATIENT)
Dept: PRIMARY CARE | Facility: CLINIC | Age: 45
End: 2025-02-07
Payer: COMMERCIAL

## 2025-02-07 NOTE — TELEPHONE ENCOUNTER
----- Message from Giovanni Contreras sent at 2/7/2025 12:45 PM EST -----  Patient has severe sleep apnea  Please order CPAP settings as per results

## 2025-02-07 NOTE — TELEPHONE ENCOUNTER
Result Communication    No results found from the In Basket message.    2:56 PM      Results were successfully communicated with the patient and they acknowledged their understanding.

## 2025-02-10 ENCOUNTER — TELEPHONE (OUTPATIENT)
Dept: PRIMARY CARE | Facility: CLINIC | Age: 45
End: 2025-02-10
Payer: COMMERCIAL

## 2025-02-10 DIAGNOSIS — G47.33 OSA (OBSTRUCTIVE SLEEP APNEA): ICD-10-CM

## 2025-02-10 NOTE — TELEPHONE ENCOUNTER
PT stated he was to call back with durable medical equipment companies his insurance covers, for CPAP order.  Pt stated [facility] and [facility]   Pt is ok with either, so whichever would be sooner.

## 2025-02-11 ENCOUNTER — APPOINTMENT (OUTPATIENT)
Dept: UROLOGY | Facility: CLINIC | Age: 45
End: 2025-02-11
Payer: COMMERCIAL

## 2025-02-11 DIAGNOSIS — N50.819 PAIN IN TESTICLE, UNSPECIFIED LATERALITY: ICD-10-CM

## 2025-02-11 DIAGNOSIS — R36.1 HEMOSPERMIA: Primary | ICD-10-CM

## 2025-02-11 PROCEDURE — 1036F TOBACCO NON-USER: CPT | Performed by: UROLOGY

## 2025-02-11 PROCEDURE — 99213 OFFICE O/P EST LOW 20 MIN: CPT | Performed by: UROLOGY

## 2025-02-11 NOTE — PROGRESS NOTES
Subjective   Cody Davies is a 45 y.o. male here for evaluation regarding elective sterilization. he is aware of alternatives to vasectomy.     Previous Children: 3 (10 months, 13 and 8 yr old girls)   Previous scrotal surgeries? no  Any current scrotal pain? no  Taking blood thinners? no    Also had recent hematospermia  -resolved and came back  -on and off  -denies dysuria, hematuria    No past medical history on file.    No past surgical history on file.  Component      Latest Ref Rng 4/4/2024   Prostate Specific Antigen,Screen      <=4.00 ng/mL 0.56        UA:  Component      Latest Ref Rng 8/14/2024   POC Color, Urine      Straw, Yellow, Light-Yellow  Yellow    POC Appearance, Urine      Clear  Clear    POC Specific Gravity, Urine      1.005 - 1.035  1.015    POC PH, Urine      No Reference Range Established PH 7.5    POC Protein, Urine      NEGATIVE, 30 (1+) mg/dl NEGATIVE    POC Glucose, Urine      NEGATIVE mg/dl NEGATIVE    POC Blood, Urine      NEGATIVE  NEGATIVE    POC Ketones, Urine      NEGATIVE mg/dl NEGATIVE    POC Bilirubin, Urine      NEGATIVE  NEGATIVE    POC Urobilinogen, Urine      0.2, 1.0 EU/DL 0.2    Poc Nitrite, Urine      NEGATIVE  NEGATIVE    POC Leukocytes, Urine      NEGATIVE  NEGATIVE           Your medication list            Accurate as of February 11, 2025  3:04 PM. If you have any questions, ask your nurse or doctor.                CONTINUE taking these medications        Instructions Last Dose Given Next Dose Due   escitalopram 20 mg tablet  Commonly known as: Lexapro      TAKE 1 TABLET DAILY       multivitamin tablet                    Allergies   Allergen Reactions    Amoxicillin-Pot Clavulanate Unknown        Exam  Testicles descended bilaterally, vas palpable  Penis without lesions    #Vasectomy  Desires elective vasectomy.  In summary, the patient has a history of fecundity anxiety and would like to proceed with a vasectomy.  I had a detailed discussion with him regarding  the risks, benefits and alternatives to the procedure and he would like to proceed at this time.      I discussed the following with the patient:    · Vasectomy is intended to be a permanent form of contraception.  · Vasectomy does not produce immediate sterility.  · Following vasectomy, another form of contraception is required until vas occlusion is confirmed by post- vasectomy semen analysis (PVSA).  · Even after vas occlusion is confirmed, vasectomy is not 100% reliable in preventing pregnancy.  · The risk of pregnancy after vasectomy is approximately 1 in 2,000 for men who have post-vasectomy azoospermia or PVSA showing rare non-motile sperm (RNMS).  · Repeat vasectomy is necessary in <1% of vasectomies, provided that a technique for vas occlusion known to have a low occlusive failure rate has been used.  · Patients should refrain from ejaculation for approximately one week after vasectomy.  · Options for fertility after vasectomy include vasectomy reversal and sperm retrieval with in vitro fertilization. These options are not always successful, and they may be expensive.    Sperm banking was also offered to the patient.    He understands that he must have protected intercourse after the vasectomy until he is able to provide a negative semen sample.  He may stop using other methods of contraception when examination of one well-mixed, uncentrifuged, fresh post-vasectomy semen specimen shows azoospermia or only rare non-motile sperm (RNMS or <100,000 non-motile sperm/mL).    Cannot do MRI study  Will proceed with vasectomy with: Local  Sperm Banking information given per patient request : no     #hematospermia  UA/CX/GC/CT/ureaplasma/mycoplasma

## 2025-02-14 LAB
BACTERIA #/AREA URNS HPF: NORMAL /HPF
BACTERIA UR CULT: NORMAL
C TRACH RRNA SPEC QL NAA+PROBE: NOT DETECTED
HYALINE CASTS #/AREA URNS LPF: NORMAL /LPF
M HOMINIS SPEC QL CULT: NORMAL
N GONORRHOEA RRNA SPEC QL NAA+PROBE: NOT DETECTED
QUEST GC CT AMPLIFIED (ALWAYS MESSAGE): NORMAL
RBC #/AREA URNS HPF: NORMAL /HPF
SERVICE CMNT-IMP: NORMAL
SQUAMOUS #/AREA URNS HPF: NORMAL /HPF
WBC #/AREA URNS HPF: NORMAL /HPF

## 2025-02-28 ENCOUNTER — APPOINTMENT (OUTPATIENT)
Dept: UROLOGY | Facility: CLINIC | Age: 45
End: 2025-02-28
Payer: COMMERCIAL

## 2025-02-28 VITALS — HEIGHT: 70 IN | BODY MASS INDEX: 30.35 KG/M2 | TEMPERATURE: 97 F | WEIGHT: 212 LBS

## 2025-02-28 DIAGNOSIS — Z30.09 VASECTOMY EVALUATION: ICD-10-CM

## 2025-02-28 PROCEDURE — 99213 OFFICE O/P EST LOW 20 MIN: CPT | Performed by: UROLOGY

## 2025-02-28 PROCEDURE — 55250 REMOVAL OF SPERM DUCT(S): CPT | Performed by: UROLOGY

## 2025-02-28 ASSESSMENT — PAIN SCALES - GENERAL: PAINLEVEL_OUTOF10: 0-NO PAIN

## 2025-02-28 NOTE — PROGRESS NOTES
Pre-Procedure Diagnosis: Fecundity anxiety   Post-Procedure Diagnosis: Same   Procedure Performed: Bilateral vasectomy     Surgeon: Iza Morales MD   Assistant: Reshma Soriano RN  EBL: 5cc   Complications: None   Specimens: None   Anesthesia: Local        Procedure in Detail:     After obtaining informed consent, the patient was prepped and draped in the standard sterile fashion in the lower abdominal and genitalia region.       Starting on the patient's right hemiscrotum, the vas deferens was identified and isolated. Local analgesia was performed using a 50:50 mixture of 1% lidocaine and 1% bupivicaine to anesthetize the skin and the perivasal tissue.  A 1 cm longitudinal incision was made and the vas deferens was isolated with a vas clamp. The vasal tissue was incised and the vas deferens was brought out through the wound The middle segment of vas deferens was then excised and passed off of the field. The musocal was cauterized with needle tip bovie electrocautery. After obtaining adequate hemostasis, the distal vasal end was put back into the scrotum .  The proximal vasal end was then dropped back into the scrotum and the scrotum was closed with 3-0 chromic in a simple interrupted fashion.  An identical procedure was performed on the patients left side.      The wounds were then cleaned and draped with sterile telfa gauze.  Scrotal support and fluffs were applied. He tolerated the procedure well and was sent home in stable condition. I gave him strict instructions that he must have a negative semen sample before engaging in unprotected intercourse or he is at risk for achieving a pregnancy. He will follow up in 4 months.     Preet Attestation  By signing my name below, IMegan Scribe attest that this documentation has been prepared under the direction and in the presence of Iza Morales MD. All medical record entries made by the Preet were at my direction or personally dictated by me.  I have reviewed the chart and agree that the record accurately reflects my personal performance of the history, physical exam, discussion and plan.

## 2025-02-28 NOTE — PROGRESS NOTES
Subjective   Cody Davies is a 45 y.o. male here for evaluation regarding elective sterilization. he is aware of alternatives to vasectomy.     Last visit 2/11/2025:  -Will proceed with Vasectomy.    Today's visit  Previous Children: 3 (10 months, 13 and 8 yr old girls)   Previous scrotal surgeries? no  Any current scrotal pain? no  Taking blood thinners? no    hematospermia  -resolved and came back  -on and off  -denies dysuria, hematuria  -labs reviewed, all neg, but mycoplasma and ureaplasma pending   Ucx neg  GC/CT negative    Component      Latest Ref Rng 2/11/2025   WBC      < OR = 5 /HPF NONE SEEN    RBC      < OR = 2 /HPF NONE SEEN    SQUAMOUS EPITHELIAL CELLS      < OR = 5 /HPF NONE SEEN    BACTERIA      NONE SEEN /HPF NONE SEEN    HYALINE CAST      NONE SEEN /LPF NONE SEEN    NOTE --        Component      Latest Ref Rng 4/4/2024   Prostate Specific Antigen,Screen      <=4.00 ng/mL 0.56        UA:  Component      Latest Ref Rng 8/14/2024   POC Color, Urine      Straw, Yellow, Light-Yellow  Yellow    POC Appearance, Urine      Clear  Clear    POC Specific Gravity, Urine      1.005 - 1.035  1.015    POC PH, Urine      No Reference Range Established PH 7.5    POC Protein, Urine      NEGATIVE, 30 (1+) mg/dl NEGATIVE    POC Glucose, Urine      NEGATIVE mg/dl NEGATIVE    POC Blood, Urine      NEGATIVE  NEGATIVE    POC Ketones, Urine      NEGATIVE mg/dl NEGATIVE    POC Bilirubin, Urine      NEGATIVE  NEGATIVE    POC Urobilinogen, Urine      0.2, 1.0 EU/DL 0.2    Poc Nitrite, Urine      NEGATIVE  NEGATIVE    POC Leukocytes, Urine      NEGATIVE  NEGATIVE           Your medication list            Accurate as of February 28, 2025 11:39 AM. If you have any questions, ask your nurse or doctor.                CONTINUE taking these medications        Instructions Last Dose Given Next Dose Due   escitalopram 20 mg tablet  Commonly known as: Lexapro      TAKE 1 TABLET DAILY       multivitamin tablet                     Allergies   Allergen Reactions    Amoxicillin-Pot Clavulanate Unknown        Exam  Testicles descended bilaterally, vas palpable  Penis without lesions    #Vasectomy  Desires elective vasectomy.  In summary, the patient has a history of fecundity anxiety and would like to proceed with a vasectomy.  I had a detailed discussion with him regarding the risks, benefits and alternatives to the procedure and he would like to proceed at this time.      I discussed the following with the patient:    · Vasectomy is intended to be a permanent form of contraception.  · Vasectomy does not produce immediate sterility.  · Following vasectomy, another form of contraception is required until vas occlusion is confirmed by post- vasectomy semen analysis (PVSA).  · Even after vas occlusion is confirmed, vasectomy is not 100% reliable in preventing pregnancy.  · The risk of pregnancy after vasectomy is approximately 1 in 2,000 for men who have post-vasectomy azoospermia or PVSA showing rare non-motile sperm (RNMS).  · Repeat vasectomy is necessary in <1% of vasectomies, provided that a technique for vas occlusion known to have a low occlusive failure rate has been used.  · Patients should refrain from ejaculation for approximately one week after vasectomy.  · Options for fertility after vasectomy include vasectomy reversal and sperm retrieval with in vitro fertilization. These options are not always successful, and they may be expensive.    Sperm banking was also offered to the patient.    He understands that he must have protected intercourse after the vasectomy until he is able to provide a negative semen sample.  He may stop using other methods of contraception when examination of one well-mixed, uncentrifuged, fresh post-vasectomy semen specimen shows azoospermia or only rare non-motile sperm (RNMS or <100,000 non-motile sperm/mL).    Cannot do MRI study  Will proceed with vasectomy with: Local  Sperm Banking information  given per patient request : no     #hematospermia  UA/CX/GC/CT/ureaplasma/mycoplasma     Scribe Attestation  By signing my name below, I, Preet Nixon attest that this documentation has been prepared under the direction and in the presence of Iza Morales MD. All medical record entries made by the [place] were at my direction or personally dictated by me. I have reviewed the chart and agree that the record accurately reflects my personal performance of the history, physical exam, discussion and plan.

## 2025-03-24 ENCOUNTER — APPOINTMENT (OUTPATIENT)
Dept: PRIMARY CARE | Facility: CLINIC | Age: 45
End: 2025-03-24
Payer: COMMERCIAL

## 2025-03-24 VITALS
WEIGHT: 212 LBS | HEIGHT: 70 IN | BODY MASS INDEX: 30.35 KG/M2 | SYSTOLIC BLOOD PRESSURE: 110 MMHG | DIASTOLIC BLOOD PRESSURE: 78 MMHG

## 2025-03-24 DIAGNOSIS — K50.919 CROHN'S DISEASE WITH COMPLICATION, UNSPECIFIED GASTROINTESTINAL TRACT LOCATION: ICD-10-CM

## 2025-03-24 DIAGNOSIS — G47.33 OSA (OBSTRUCTIVE SLEEP APNEA): Primary | ICD-10-CM

## 2025-03-24 PROCEDURE — 1036F TOBACCO NON-USER: CPT | Performed by: FAMILY MEDICINE

## 2025-03-24 PROCEDURE — 99213 OFFICE O/P EST LOW 20 MIN: CPT | Performed by: FAMILY MEDICINE

## 2025-03-24 PROCEDURE — 3008F BODY MASS INDEX DOCD: CPT | Performed by: FAMILY MEDICINE

## 2025-03-24 ASSESSMENT — ENCOUNTER SYMPTOMS
PALPITATIONS: 0
VOMITING: 0
UNEXPECTED WEIGHT CHANGE: 0
CONSTIPATION: 0

## 2025-03-24 NOTE — PROGRESS NOTES
"Subjective   Patient ID: Cody Davies is a 45 y.o. male who presents for Follow-up (Pt in to follow up with cpap use.  Pt has noticed a huge improvement.  ).    HPI   On cpap 5-6 weeks now  Last 2 weeks has felt rested and clear minded  Switches tasks easier  Less depressed  Uses the mask nightly except 3 nights, uses it for 6-8 hours a night  No side effects or issues with it  He cleans it, it is easy to use  No snoring  No daytime sleepiness, far less fatigue he says  Eating better, lost 6#, more active  [place]  Review of Systems   Constitutional:  Negative for unexpected weight change.   HENT:  Negative for congestion and ear discharge.    Cardiovascular:  Negative for chest pain and palpitations.   Gastrointestinal:  Negative for constipation and vomiting.   All other systems reviewed and are negative.      Objective   /78   Ht 1.778 m (5' 10\")   Wt 96.2 kg (212 lb)   BMI 30.42 kg/m²     Physical Exam  HENT:      Head: Normocephalic and atraumatic.      Nose: Nose normal.      Mouth/Throat:      Mouth: Mucous membranes are moist.      Pharynx: No oropharyngeal exudate.   Eyes:      Extraocular Movements: Extraocular movements intact.      Conjunctiva/sclera: Conjunctivae normal.      Pupils: Pupils are equal, round, and reactive to light.   Cardiovascular:      Rate and Rhythm: Normal rate and regular rhythm.   Pulmonary:      Effort: Pulmonary effort is normal.      Breath sounds: Normal breath sounds.   Abdominal:      General: There is no distension.      Palpations: Abdomen is soft.   Musculoskeletal:      Cervical back: Normal range of motion and neck supple.   Lymphadenopathy:      Cervical: No cervical adenopathy.   Neurological:      General: No focal deficit present.      Mental Status: He is alert.   Psychiatric:         Attention and Perception: Attention normal.         Speech: Speech normal.         Behavior: Behavior is cooperative.         Assessment/Plan   Problem List Items Addressed " This Visit             ICD-10-CM    Regional enteritis (Multi) K50.90     Other Visit Diagnoses         Codes    TEO (obstructive sleep apnea)    -  Primary G47.33        Obstructive Sleep apnea will controlled with CPAP  Continue current regimen  Diet exercise weight discussed  Recheck 3 to 6 months sooner if any issues arise

## 2025-07-02 ENCOUNTER — ANCILLARY PROCEDURE (OUTPATIENT)
Dept: ENDOCRINOLOGY | Facility: CLINIC | Age: 45
End: 2025-07-02
Payer: COMMERCIAL

## 2025-07-02 DIAGNOSIS — Z30.09 VASECTOMY EVALUATION: ICD-10-CM

## 2025-07-02 LAB
ABSTINENCE (DAYS): 7 DAYS (ref 2–7)
AGGLUTINATION (SEMEN): NO
ANALYZED TIME:: ABNORMAL
ANDROLOGY LAB ID#: ABNORMAL
CLUMPS (SEMEN): NO
COLLECTED COMPLETELY: YES
COLLECTION LOCATION:: ABNORMAL
COLLECTION METHOD:: ABNORMAL
CONCENTRATION CN (POST-WASH): 0 MILL/ML
CONCENTRATION(SEMEN): 0 MILL/ML (ref 15–?)
DEBRIS (SEMEN): YES
NON PROG. MOTILITY (SEMEN): 0 %
NON PROG. MOTILITY CN (POST-WASH): 0 %
PROG. MOTILITY (SEMEN): 0 % (ref 32–?)
PROG. MOTILITY CN (POST-WASH): 0 %
RECEIVED TIME:: ABNORMAL
SEMEN APPEARANCE: NORMAL
SEMEN LIQUEFACTION: NORMAL
SEMEN VISCOSITY: ABNORMAL
TOTAL MOTILITY (SEMEN): 0 % (ref 40–?)
TOTAL MOTILITY CN (POST-WASH): 0 %
TOTAL NO OF MOTILE (SEMEN): 0 MILL
TOTAL NO OF MOTILE CN (POST-WASH): 0 MILL
TOTAL NO OF SPERM (SEMEN): 0 MILL (ref 39–?)
TOTAL NO OF SPERM CN (POST-WASH): 0 MILL
VOLUME (SEMEN): 1.8 ML (ref 1.5–?)
VOLUME CN (POST-WASH): 0.2 ML

## 2025-07-02 PROCEDURE — 89321 SEMEN ANAL SPERM DETECTION: CPT | Performed by: OBSTETRICS & GYNECOLOGY

## 2025-08-03 ENCOUNTER — HOSPITAL ENCOUNTER (EMERGENCY)
Facility: HOSPITAL | Age: 45
Discharge: HOME | End: 2025-08-03
Attending: STUDENT IN AN ORGANIZED HEALTH CARE EDUCATION/TRAINING PROGRAM
Payer: COMMERCIAL

## 2025-08-03 ENCOUNTER — APPOINTMENT (OUTPATIENT)
Dept: RADIOLOGY | Facility: HOSPITAL | Age: 45
End: 2025-08-03
Payer: COMMERCIAL

## 2025-08-03 VITALS
SYSTOLIC BLOOD PRESSURE: 116 MMHG | HEART RATE: 66 BPM | WEIGHT: 210 LBS | TEMPERATURE: 97.7 F | BODY MASS INDEX: 30.06 KG/M2 | DIASTOLIC BLOOD PRESSURE: 73 MMHG | RESPIRATION RATE: 15 BRPM | HEIGHT: 70 IN | OXYGEN SATURATION: 96 %

## 2025-08-03 DIAGNOSIS — S67.22XA CRUSHING INJURY OF LEFT HAND, INITIAL ENCOUNTER: ICD-10-CM

## 2025-08-03 DIAGNOSIS — S61.412A LACERATION OF LEFT HAND WITHOUT FOREIGN BODY, INITIAL ENCOUNTER: Primary | ICD-10-CM

## 2025-08-03 PROCEDURE — 90715 TDAP VACCINE 7 YRS/> IM: CPT | Performed by: PHYSICIAN ASSISTANT

## 2025-08-03 PROCEDURE — 73130 X-RAY EXAM OF HAND: CPT | Mod: LT

## 2025-08-03 PROCEDURE — 12002 RPR S/N/AX/GEN/TRNK2.6-7.5CM: CPT | Performed by: PHYSICIAN ASSISTANT

## 2025-08-03 PROCEDURE — 2500000004 HC RX 250 GENERAL PHARMACY W/ HCPCS (ALT 636 FOR OP/ED): Performed by: PHYSICIAN ASSISTANT

## 2025-08-03 PROCEDURE — 73130 X-RAY EXAM OF HAND: CPT | Mod: LEFT SIDE | Performed by: RADIOLOGY

## 2025-08-03 PROCEDURE — 99283 EMERGENCY DEPT VISIT LOW MDM: CPT | Performed by: STUDENT IN AN ORGANIZED HEALTH CARE EDUCATION/TRAINING PROGRAM

## 2025-08-03 PROCEDURE — 90471 IMMUNIZATION ADMIN: CPT | Performed by: PHYSICIAN ASSISTANT

## 2025-08-03 RX ORDER — LIDOCAINE HYDROCHLORIDE 10 MG/ML
10 INJECTION, SOLUTION INFILTRATION; PERINEURAL ONCE
Status: COMPLETED | OUTPATIENT
Start: 2025-08-03 | End: 2025-08-03

## 2025-08-03 RX ADMIN — LIDOCAINE HYDROCHLORIDE 10 ML: 10 INJECTION, SOLUTION INFILTRATION; PERINEURAL at 18:49

## 2025-08-03 RX ADMIN — TETANUS TOXOID, REDUCED DIPHTHERIA TOXOID AND ACELLULAR PERTUSSIS VACCINE, ADSORBED 0.5 ML: 5; 2.5; 8; 8; 2.5 SUSPENSION INTRAMUSCULAR at 19:32

## 2025-08-03 ASSESSMENT — PAIN DESCRIPTION - LOCATION: LOCATION: HAND

## 2025-08-03 ASSESSMENT — PAIN DESCRIPTION - ORIENTATION: ORIENTATION: LEFT

## 2025-08-03 ASSESSMENT — PAIN - FUNCTIONAL ASSESSMENT: PAIN_FUNCTIONAL_ASSESSMENT: 0-10

## 2025-08-03 ASSESSMENT — PAIN SCALES - GENERAL
PAINLEVEL_OUTOF10: 0 - NO PAIN
PAINLEVEL_OUTOF10: 2

## 2025-08-03 NOTE — ED NOTES
Pt came into the ER with a laceration above his left fourth finger.  He is unable to remove his wedding band due to the swelling.       Raymond Hernandez RN  08/03/25 6779

## 2025-08-03 NOTE — ED PROVIDER NOTES
HPI   Chief Complaint   Patient presents with    Hand Injury     Pt states he was moving home gym equipment and left hand got smashed between 2 pieces of metal. Laceration below left 4th finger       History of present illness:  45-year-old male presents emergency room for complaints of left hand crush injury and lacerations.  The patient states that he was attempting to move a home gym equipment and he states a couple of the bars fell and unfortunately they landed on top of his left hand as he was holding another piece crushing his hand.  He states that his hand immediately began to swell and he was able to get the virus off his hand.  He states he is right-hand dominant.  He has not been look at his wedding ring freed and states the swelling is gotten worse.  He denies any numbness or tingling in his fingers at this time and noticed lacerations across the fourth knuckle and was concerned about this.  He states he cannot recall his last tetanus shot.  He denies any other symptoms at this time.    Social history: Negative for alcohol and drug use.    Review of systems:   Gen.: No weight loss, fatigue, anorexia, insomnia, fever.   Eyes: No vision loss, double vision, drainage, eye pain.   ENT: No pharyngitis, dry mouth.   Cardiac: No chest pain, palpitations, syncope, near syncope.   Pulmonary: No shortness of breath, cough, hemoptysis.   Heme/lymph: No swollen glands, fever, bleeding.   GI: No abdominal pain, change in bowel habits, melena, hematemesis, hematochezia, nausea, vomiting, diarrhea.   : No discharge, dysuria, frequency, urgency, hematuria.   Musculoskeletal: No  joint pain, joint swelling.   Skin: No rashes.   Review of systems is otherwise negative unless stated above or in history of present illness.      Physical exam:  General: Vitals noted, no distress. Afebrile.   EENT: No lymphadenopathy appreciated  Cardiac: Regular, rate, rhythm, no murmur.   Pulmonary: Lungs clear bilaterally with good  aeration. No adventitious breath sounds.   Abdomen: Soft, nonsurgical. Nontender. No peritoneal signs. Normoactive bowel sounds.   Extremities: No peripheral edema.  There is obvious swelling present over the left hand particularly over the fourth digit and at the base of the fourth digit over the fourth metacarpal, good cap refill and sensation is present all fingers at this time, nailbeds are intact, there is a gold ring present at the base of the fourth digit on the left hand.  This cannot be removed due to the swelling at this time.  I first attempted using lubrication without success and also attempted to use a rubber band to try and help free the ring but the patient would not tolerate the procedure.  We opted with his permission to cut the ring at this time which was done we are able to pry it apart and slide off his finger.  Following this cap refill is appropriate.  And sensation was intact.  There is a small laceration present just above the fourth knuckle on the left hand as well that will need sutured.  No obvious foreign body present at this time.  Skin: No rash.   Neuro: No focal neurologic deficits      Medical decision making:   Testing: Left hand x-rays: No acute findings as interpreted by myself  Plan: Home-going.  Discussed differential. Will follow-up with the primary physician in the next 2-3 days. Return if worse. They understand return precautions and discharge instructions. Patient and family/friend/caregiver are in agreement with this plan. 45-year-old male presents emergency room for complaints of left hand crush injury and lacerations.  The patient states that he was attempting to move a home gym equipment and he states a couple of the bars fell and unfortunately they landed on top of his left hand as he was holding another piece crushing his hand.  He states that his hand immediately began to swell and he was able to get the virus off his hand.  He states he is right-hand dominant.  He  has not been look at his wedding ring freed and states the swelling is gotten worse.  He denies any numbness or tingling in his fingers at this time and noticed lacerations across the fourth knuckle and was concerned about this.  He states he cannot recall his last tetanus shot.  He denies any other symptoms at this time. Extremities: No peripheral edema.  There is obvious swelling present over the left hand particularly over the fourth digit and at the base of the fourth digit over the fourth metacarpal, good cap refill and sensation is present all fingers at this time, nailbeds are intact, there is a gold ring present at the base of the fourth digit on the left hand.  This cannot be removed due to the swelling at this time.  I first attempted using lubrication without success and also attempted to use a rubber band to try and help free the ring but the patient would not tolerate the procedure.  We opted with his permission to cut the ring at this time which was done we are able to pry it apart and slide off his finger.  Following this cap refill is appropriate.  And sensation was intact.  There is a small laceration present just above the fourth knuckle on the left hand as well that will need sutured.  No obvious foreign body present at this time.  4 simple interrupted sutures were placed to close the laceration across the fourth knuckle at this time.  The patient tolerated the procedure well and given appropriate follow-up with his primary care doctor and explained to him that I felt they could follow-up with them in 1 week to have the sutures removed.  I encouraged him return event he had any worsening symptoms.  Impression:   1.  Crush injury to hand  2.  Hand laceration          History provided by:  Patient   used: No            Patient History   Medical History[1]  Surgical History[2]  Family History[3]  Social History[4]    Physical Exam   ED Triage Vitals [08/03/25 1757]   Temperature  Heart Rate Respirations BP   36.2 °C (97.2 °F) 62 16 120/78      Pulse Ox Temp Source Heart Rate Source Patient Position   97 % Temporal Monitor Sitting      BP Location FiO2 (%)     Right leg --       Physical Exam      ED Course & MDM   Diagnoses as of 25 1003   Laceration of left hand without foreign body, initial encounter   Crushing injury of left hand, initial encounter                 No data recorded     Kailey Coma Scale Score: 15 (25 1758 : Jadyn Seymour RN)                           Medical Decision Making      Procedure  Procedures       [1] History reviewed. No pertinent past medical history.  [2] History reviewed. No pertinent surgical history.  [3] No family history on file.  [4]   Social History  Tobacco Use    Smoking status: Former     Current packs/day: 0.00     Average packs/day: 1 pack/day for 10.0 years (10.0 ttl pk-yrs)     Types: Cigarettes     Start date:      Quit date:      Years since quittin.5    Smokeless tobacco: Never   Vaping Use    Vaping status: Never Used   Substance Use Topics    Alcohol use: Not Currently    Drug use: Never        Eric Suh PA-C  25 1006

## 2025-08-04 NOTE — ED PROCEDURE NOTE
Procedure  Laceration Repair    Performed by: Eric Suh PA-C  Authorized by: Oumar Finney DO    Consent:     Consent obtained:  Verbal  Universal protocol:     Patient identity confirmed:  Verbally with patient, arm band and provided demographic data  Anesthesia:     Anesthesia method:  Local infiltration    Local anesthetic:  Lidocaine 1% w/o epi  Laceration details:     Location:  Hand    Hand location:  L hand, dorsum    Length (cm):  3    Depth (mm):  5  Exploration:     Contaminated: no    Treatment:     Area cleansed with:  Chlorhexidine and saline    Visualized foreign bodies/material removed: no      Debridement:  None    Undermining:  None    Scar revision: no    Skin repair:     Repair method:  Sutures    Suture size:  4-0    Suture material:  Prolene    Suture technique:  Simple interrupted    Number of sutures:  4  Approximation:     Approximation:  Close  Repair type:     Repair type:  Simple  Post-procedure details:     Dressing:  Open (no dressing)    Procedure completion:  Tolerated               Eric Suh PA-C  08/04/25 1005

## 2025-09-17 ENCOUNTER — APPOINTMENT (OUTPATIENT)
Dept: PRIMARY CARE | Facility: CLINIC | Age: 45
End: 2025-09-17
Payer: COMMERCIAL